# Patient Record
Sex: FEMALE | Race: BLACK OR AFRICAN AMERICAN | NOT HISPANIC OR LATINO | URBAN - METROPOLITAN AREA
[De-identification: names, ages, dates, MRNs, and addresses within clinical notes are randomized per-mention and may not be internally consistent; named-entity substitution may affect disease eponyms.]

---

## 2017-06-07 ENCOUNTER — EMERGENCY (EMERGENCY)
Facility: HOSPITAL | Age: 37
LOS: 1 days | Discharge: HOME | End: 2017-06-07
Attending: EMERGENCY MEDICINE | Admitting: EMERGENCY MEDICINE
Payer: MEDICARE

## 2017-06-07 VITALS
OXYGEN SATURATION: 99 % | HEART RATE: 116 BPM | TEMPERATURE: 98 F | DIASTOLIC BLOOD PRESSURE: 89 MMHG | SYSTOLIC BLOOD PRESSURE: 123 MMHG | RESPIRATION RATE: 18 BRPM

## 2017-06-07 DIAGNOSIS — T40.5X1A POISONING BY COCAINE, ACCIDENTAL (UNINTENTIONAL), INITIAL ENCOUNTER: ICD-10-CM
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
## 2017-06-07 DIAGNOSIS — R41.82 ALTERED MENTAL STATUS, UNSPECIFIED: ICD-10-CM
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
## 2017-06-07 LAB
ALBUMIN SERPL ELPH-MCNC: 3.8 G/DL — SIGNIFICANT CHANGE UP (ref 3.3–5)
ALP SERPL-CCNC: 74 U/L — SIGNIFICANT CHANGE UP (ref 40–120)
ALT FLD-CCNC: 10 U/L — SIGNIFICANT CHANGE UP (ref 10–45)
ANION GAP SERPL CALC-SCNC: 16 MMOL/L — SIGNIFICANT CHANGE UP (ref 5–17)
APAP SERPL-MCNC: <15 UG/ML — SIGNIFICANT CHANGE UP (ref 10–30)
APPEARANCE UR: CLEAR — SIGNIFICANT CHANGE UP
AST SERPL-CCNC: 30 U/L — SIGNIFICANT CHANGE UP (ref 10–40)
BASOPHILS NFR BLD AUTO: 0.6 % — SIGNIFICANT CHANGE UP (ref 0–2)
BILIRUB SERPL-MCNC: 0.2 MG/DL — SIGNIFICANT CHANGE UP (ref 0.2–1.2)
BILIRUB UR-MCNC: NEGATIVE — SIGNIFICANT CHANGE UP
BUN SERPL-MCNC: 8 MG/DL — SIGNIFICANT CHANGE UP (ref 7–23)
CALCIUM SERPL-MCNC: 8.3 MG/DL — LOW (ref 8.4–10.5)
CARBAMAZEPINE SERPL-MCNC: <2 UG/ML — LOW (ref 4–12)
CHLORIDE SERPL-SCNC: 104 MMOL/L — SIGNIFICANT CHANGE UP (ref 96–108)
CO2 SERPL-SCNC: 18 MMOL/L — LOW (ref 22–31)
COLOR SPEC: YELLOW — SIGNIFICANT CHANGE UP
CREAT SERPL-MCNC: 0.9 MG/DL — SIGNIFICANT CHANGE UP (ref 0.5–1.3)
DIFF PNL FLD: NEGATIVE — SIGNIFICANT CHANGE UP
EOSINOPHIL NFR BLD AUTO: 1.3 % — SIGNIFICANT CHANGE UP (ref 0–6)
ETHANOL SERPL-MCNC: <10 MG/DL — SIGNIFICANT CHANGE UP (ref 0–10)
GAS PNL BLDV: SIGNIFICANT CHANGE UP
GLUCOSE SERPL-MCNC: 84 MG/DL — SIGNIFICANT CHANGE UP (ref 70–99)
GLUCOSE UR QL: NEGATIVE — SIGNIFICANT CHANGE UP
HCG SERPL-ACNC: <.1 MIU/ML — SIGNIFICANT CHANGE UP
HCG UR QL: NEGATIVE — SIGNIFICANT CHANGE UP
HCT VFR BLD CALC: 36.6 % — SIGNIFICANT CHANGE UP (ref 34.5–45)
HGB BLD-MCNC: 13.2 G/DL — SIGNIFICANT CHANGE UP (ref 11.5–15.5)
KETONES UR-MCNC: NEGATIVE — SIGNIFICANT CHANGE UP
LEUKOCYTE ESTERASE UR-ACNC: NEGATIVE — SIGNIFICANT CHANGE UP
LITHIUM SERPL-MCNC: <.05 MMOL/L — LOW (ref 0.6–1.2)
LYMPHOCYTES # BLD AUTO: 45.5 % — HIGH (ref 13–44)
MCHC RBC-ENTMCNC: 26.6 PG — LOW (ref 27–34)
MCHC RBC-ENTMCNC: 36.1 G/DL — HIGH (ref 32–36)
MCV RBC AUTO: 73.6 FL — LOW (ref 80–100)
MONOCYTES NFR BLD AUTO: 14.7 % — HIGH (ref 2–14)
NEUTROPHILS NFR BLD AUTO: 37.9 % — LOW (ref 43–77)
NITRITE UR-MCNC: NEGATIVE — SIGNIFICANT CHANGE UP
PH UR: 6.5 — SIGNIFICANT CHANGE UP (ref 5–8)
PLATELET # BLD AUTO: 222 K/UL — SIGNIFICANT CHANGE UP (ref 150–400)
POTASSIUM SERPL-MCNC: 5.5 MMOL/L — HIGH (ref 3.5–5.3)
POTASSIUM SERPL-SCNC: 5.5 MMOL/L — HIGH (ref 3.5–5.3)
PROT SERPL-MCNC: 7.8 G/DL — SIGNIFICANT CHANGE UP (ref 6–8.3)
PROT UR-MCNC: NEGATIVE MG/DL — SIGNIFICANT CHANGE UP
RBC # BLD: 4.97 M/UL — SIGNIFICANT CHANGE UP (ref 3.8–5.2)
RBC # FLD: 16.3 % — SIGNIFICANT CHANGE UP (ref 10.3–16.9)
SALICYLATES SERPL-MCNC: <0.3 MG/DL — LOW (ref 2.8–20)
SODIUM SERPL-SCNC: 138 MMOL/L — SIGNIFICANT CHANGE UP (ref 135–145)
SP GR SPEC: <=1.005 — SIGNIFICANT CHANGE UP (ref 1–1.03)
UROBILINOGEN FLD QL: 0.2 E.U./DL — SIGNIFICANT CHANGE UP
VALPROATE SERPL-MCNC: 85 UG/ML — SIGNIFICANT CHANGE UP (ref 50–100)
WBC # BLD: 6.2 K/UL — SIGNIFICANT CHANGE UP (ref 3.8–10.5)
WBC # FLD AUTO: 6.2 K/UL — SIGNIFICANT CHANGE UP (ref 3.8–10.5)

## 2017-06-07 PROCEDURE — 99291 CRITICAL CARE FIRST HOUR: CPT | Mod: 25

## 2017-06-07 PROCEDURE — 93010 ELECTROCARDIOGRAM REPORT: CPT

## 2017-06-07 RX ORDER — SODIUM CHLORIDE 9 MG/ML
1000 INJECTION INTRAMUSCULAR; INTRAVENOUS; SUBCUTANEOUS ONCE
Qty: 0 | Refills: 0 | Status: COMPLETED | OUTPATIENT
Start: 2017-06-07 | End: 2017-06-07

## 2017-06-07 RX ORDER — NALOXONE HYDROCHLORIDE 4 MG/.1ML
0.8 SPRAY NASAL ONCE
Qty: 0 | Refills: 0 | Status: COMPLETED | OUTPATIENT
Start: 2017-06-07 | End: 2017-06-07

## 2017-06-07 RX ORDER — NALOXONE HYDROCHLORIDE 4 MG/.1ML
1.2 SPRAY NASAL ONCE
Qty: 0 | Refills: 0 | Status: COMPLETED | OUTPATIENT
Start: 2017-06-07 | End: 2017-06-07

## 2017-06-07 RX ADMIN — SODIUM CHLORIDE 1500 MILLILITER(S): 9 INJECTION INTRAMUSCULAR; INTRAVENOUS; SUBCUTANEOUS at 19:04

## 2017-06-07 RX ADMIN — NALOXONE HYDROCHLORIDE 1.2 MILLIGRAM(S): 4 SPRAY NASAL at 18:40

## 2017-06-07 NOTE — ED ADULT NURSE REASSESSMENT NOTE - NS ED NURSE REASSESS COMMENT FT1
As per MD Nolen "hold IV narcan until we get the utox." Pt drowsy responds to verbal and tactile stimuli. HOB elevated. Will continue to monitor.

## 2017-06-07 NOTE — ED PROVIDER NOTE - PHYSICAL EXAMINATION
CON: somnolent, arousable to loud verbal or painful stimuli, HENMT: no pooling of secretion, protecting airway, no facial ecchymosis or evidence of trauma, HEAD: no obvious hematoma or laceration, CV: rrr, RESP: chest rise, breathing spontaneously, GI: soft, SKIN: no laceration or abrasion noted, MSK: no deformity noted, NEURO: limited exam 2/2 mental status

## 2017-06-07 NOTE — ED PROVIDER NOTE - OBJECTIVE STATEMENT
36 yof bibems for possible OD on ativan.  pt from group home (for psych pt), reportedly took ativan, unknown when or how much, brought here for AMS.  upon arrival, pt somnolent, no pooling of secretion but limited history.  pt placed upright, w/ NRB w/ narcan/saline mist, w/ IV narcan, psych/tox labs sent.  airway secure.  FS 120s.

## 2017-06-07 NOTE — ED ADULT NURSE REASSESSMENT NOTE - NS ED NURSE REASSESS COMMENT FT1
PT continues to be drowsy. Responds to tactile and verbal stimuli. Chest rise symmetrical. On monitor. Will continue to monitor.

## 2017-06-07 NOTE — ED ADULT TRIAGE NOTE - CHIEF COMPLAINT QUOTE
PT BIBA from [facility] facility with CO Overdose.  Per EMS, Patient ingested unknown dosage of Ativan, admin 1 dose Nasal Narcan.  Pt lethargic upon arrival to ED.  Upgraded to MD Nolen.

## 2017-06-07 NOTE — ED ADULT NURSE REASSESSMENT NOTE - NS ED NURSE REASSESS COMMENT FT1
PT verbal and aggressive. Educating pt on importance of urine sample. Pt given bed pan. PT verbal and aggressive jumping out of stretcher when positioning for straight cath . Educating pt on importance of urine sample. Pt given bed pan.

## 2017-06-07 NOTE — ED ADULT NURSE REASSESSMENT NOTE - NS ED NURSE REASSESS COMMENT FT1
PT more awake and more responsive. Answers questions. Unable to states todays date. States "[facility]" is president and states "[facility]" for place. PT recalls name.

## 2017-06-07 NOTE — ED PROVIDER NOTE - MEDICAL DECISION MAKING DETAILS
ams, possibly benzo OD, fs, airway intact, continuous neb via nebulizer, labs, straight cath for utox, fluids, reassess

## 2017-06-07 NOTE — ED PROVIDER NOTE - PROGRESS NOTE DETAILS
utox reviewed, +cocaine, however, possibly synthetic substance not detected on utox pt ao x 3, tolerating PO, spoke again, denies SI or HI, states took the prescribed psych meds given by the residence, but denies other substance.  when confronted with cocaine metabolite on utox, pt admits to using cocaine. interviewed pt again, again denies SI or HI, at this point, pt is clinically sober on exam, feels no indication for emergent psych evaluation

## 2017-06-07 NOTE — ED ADULT NURSE NOTE - CHIEF COMPLAINT QUOTE
PT BIBA from [facility] facility with CO Overdose.  Per EMS, Patient ingested unknown dosage of Ativan, admin 1 dose Nasal Narcan.  Pt lethargic upon arrival to ED.  Upgraded to MD Nolen.

## 2017-06-07 NOTE — ED ADULT NURSE NOTE - OBJECTIVE STATEMENT
36y F, lethargic, came in via ems from [facility] for overdose of ativan. Pt lethargic. Responds to verbal and tactile stimuli. Pt admits to taking ativan. Airway patent. Pt is poor historian. Given about 1mg of ativan as per EMS. Pt slowly responding more to narcan treatment. 36y F, lethargic, came in via ems from [facility] for overdose of ativan. Pt lethargic. Responds to verbal and tactile stimuli. Pt admits to taking ativan. +Perrla.  Airway patent. Pt is poor historian. Given about 1mg of ativan as per EMS. Pt slowly responding more to narcan treatment.

## 2017-06-08 VITALS
HEART RATE: 97 BPM | TEMPERATURE: 98 F | DIASTOLIC BLOOD PRESSURE: 82 MMHG | SYSTOLIC BLOOD PRESSURE: 113 MMHG | RESPIRATION RATE: 18 BRPM | OXYGEN SATURATION: 100 %

## 2017-06-08 LAB
ANION GAP SERPL CALC-SCNC: 12 MMOL/L — SIGNIFICANT CHANGE UP (ref 5–17)
BUN SERPL-MCNC: 8 MG/DL — SIGNIFICANT CHANGE UP (ref 7–23)
CALCIUM SERPL-MCNC: 7.8 MG/DL — LOW (ref 8.4–10.5)
CHLORIDE SERPL-SCNC: 106 MMOL/L — SIGNIFICANT CHANGE UP (ref 96–108)
CO2 SERPL-SCNC: 21 MMOL/L — LOW (ref 22–31)
CREAT SERPL-MCNC: 0.9 MG/DL — SIGNIFICANT CHANGE UP (ref 0.5–1.3)
GLUCOSE SERPL-MCNC: 91 MG/DL — SIGNIFICANT CHANGE UP (ref 70–99)
POTASSIUM SERPL-MCNC: 4.2 MMOL/L — SIGNIFICANT CHANGE UP (ref 3.5–5.3)
POTASSIUM SERPL-SCNC: 4.2 MMOL/L — SIGNIFICANT CHANGE UP (ref 3.5–5.3)
SODIUM SERPL-SCNC: 139 MMOL/L — SIGNIFICANT CHANGE UP (ref 135–145)

## 2017-06-08 PROCEDURE — 82803 BLOOD GASES ANY COMBINATION: CPT

## 2017-06-08 PROCEDURE — 96374 THER/PROPH/DIAG INJ IV PUSH: CPT

## 2017-06-08 PROCEDURE — 81003 URINALYSIS AUTO W/O SCOPE: CPT

## 2017-06-08 PROCEDURE — 36415 COLL VENOUS BLD VENIPUNCTURE: CPT

## 2017-06-08 PROCEDURE — 80178 ASSAY OF LITHIUM: CPT

## 2017-06-08 PROCEDURE — 84132 ASSAY OF SERUM POTASSIUM: CPT

## 2017-06-08 PROCEDURE — 85025 COMPLETE CBC W/AUTO DIFF WBC: CPT

## 2017-06-08 PROCEDURE — 80053 COMPREHEN METABOLIC PANEL: CPT

## 2017-06-08 PROCEDURE — 84702 CHORIONIC GONADOTROPIN TEST: CPT

## 2017-06-08 PROCEDURE — 84295 ASSAY OF SERUM SODIUM: CPT

## 2017-06-08 PROCEDURE — 99291 CRITICAL CARE FIRST HOUR: CPT | Mod: 25

## 2017-06-08 PROCEDURE — 80164 ASSAY DIPROPYLACETIC ACD TOT: CPT

## 2017-06-08 PROCEDURE — 80048 BASIC METABOLIC PNL TOTAL CA: CPT

## 2017-06-08 PROCEDURE — 80307 DRUG TEST PRSMV CHEM ANLYZR: CPT

## 2017-06-08 PROCEDURE — 93005 ELECTROCARDIOGRAM TRACING: CPT

## 2017-06-08 PROCEDURE — 81025 URINE PREGNANCY TEST: CPT

## 2017-06-08 PROCEDURE — 82330 ASSAY OF CALCIUM: CPT

## 2017-06-08 PROCEDURE — 80156 ASSAY CARBAMAZEPINE TOTAL: CPT

## 2017-06-08 NOTE — ED PEDIATRIC NURSE REASSESSMENT NOTE - NS ED NURSE REASSESS COMMENT FT2
pt resting in stretcher. awaiting transportation to [facility]. Will continue to monitor

## 2017-06-08 NOTE — ED ADULT NURSE REASSESSMENT NOTE - NS ED NURSE REASSESS COMMENT FT1
pt aaox3, walks with steady gait. VS stable. Senior ride picked up pt to return to [facility]. Chema at  informed pt is on her way.

## 2017-06-08 NOTE — ED ADULT NURSE REASSESSMENT NOTE - NS ED NURSE REASSESS COMMENT FT1
pt resting in stretcher. Water offered and pt refused. Awaiting transportation to [facility]. Will continue to monitor.